# Patient Record
Sex: FEMALE | Race: WHITE | Employment: PART TIME | URBAN - METROPOLITAN AREA
[De-identification: names, ages, dates, MRNs, and addresses within clinical notes are randomized per-mention and may not be internally consistent; named-entity substitution may affect disease eponyms.]

---

## 2020-01-16 ENCOUNTER — HOSPITAL ENCOUNTER (EMERGENCY)
Age: 50
Discharge: HOME | End: 2020-01-16
Payer: COMMERCIAL

## 2020-01-16 ENCOUNTER — APPOINTMENT (OUTPATIENT)
Dept: GENERAL RADIOLOGY | Age: 50
End: 2020-01-16
Payer: COMMERCIAL

## 2020-01-16 VITALS
HEART RATE: 72 BPM | HEIGHT: 66 IN | TEMPERATURE: 98.3 F | DIASTOLIC BLOOD PRESSURE: 80 MMHG | SYSTOLIC BLOOD PRESSURE: 144 MMHG | RESPIRATION RATE: 16 BRPM | OXYGEN SATURATION: 100 % | WEIGHT: 280 LBS | BODY MASS INDEX: 45 KG/M2

## 2020-01-16 PROCEDURE — 90471 IMMUNIZATION ADMIN: CPT | Performed by: NURSE PRACTITIONER

## 2020-01-16 PROCEDURE — 90715 TDAP VACCINE 7 YRS/> IM: CPT | Performed by: NURSE PRACTITIONER

## 2020-01-16 PROCEDURE — 6360000002 HC RX W HCPCS: Performed by: NURSE PRACTITIONER

## 2020-01-16 PROCEDURE — 99283 EMERGENCY DEPT VISIT LOW MDM: CPT

## 2020-01-16 PROCEDURE — 6370000000 HC RX 637 (ALT 250 FOR IP): Performed by: NURSE PRACTITIONER

## 2020-01-16 PROCEDURE — 73660 X-RAY EXAM OF TOE(S): CPT

## 2020-01-16 RX ORDER — CEPHALEXIN 500 MG/1
500 CAPSULE ORAL 4 TIMES DAILY
Qty: 20 CAPSULE | Refills: 0 | Status: SHIPPED | OUTPATIENT
Start: 2020-01-16 | End: 2020-01-21

## 2020-01-16 RX ORDER — NAPROXEN 500 MG/1
500 TABLET ORAL 2 TIMES DAILY PRN
Qty: 14 TABLET | Refills: 0 | Status: SHIPPED | OUTPATIENT
Start: 2020-01-16 | End: 2022-10-26

## 2020-01-16 RX ORDER — DIAPER,BRIEF,INFANT-TODD,DISP
EACH MISCELLANEOUS ONCE
Status: COMPLETED | OUTPATIENT
Start: 2020-01-16 | End: 2020-01-16

## 2020-01-16 RX ORDER — IBUPROFEN 800 MG/1
800 TABLET ORAL ONCE
Status: COMPLETED | OUTPATIENT
Start: 2020-01-16 | End: 2020-01-16

## 2020-01-16 RX ADMIN — IBUPROFEN 800 MG: 800 TABLET ORAL at 18:57

## 2020-01-16 RX ADMIN — BACITRACIN ZINC: 500 OINTMENT TOPICAL at 19:22

## 2020-01-16 RX ADMIN — TETANUS TOXOID, REDUCED DIPHTHERIA TOXOID AND ACELLULAR PERTUSSIS VACCINE, ADSORBED 0.5 ML: 5; 2.5; 8; 8; 2.5 SUSPENSION INTRAMUSCULAR at 18:57

## 2020-01-16 ASSESSMENT — PAIN SCALES - GENERAL: PAINLEVEL_OUTOF10: 0

## 2020-01-16 NOTE — ED PROVIDER NOTES
right.              Tenderness: mild. Swelling: None. Deformity: no.              ROM: full range of motion. Skin:  Small puncture wound with bruising to the DIP of the great toe. Neurovascular: Motor deficit: none. Sensory deficit: none; full sensation intact to light touch in distal toe. Pulse deficit: none; 2 + pedal and posterior tibial pulses intact. Capillary refill: normal.  Foot:             Tenderness:  none. Swelling: None. Deformity: no.            ROM: full range of motion. Skin:  no erythema, rash or wounds noted. Gait:  normal.  Lymphatics: No lymphangitis or adenopathy noted. Neurological:  Oriented. Motor functions intact. .    Lab / Imaging Results   (All laboratory and radiology results have been personally reviewed by myself)  Labs:  No results found for this visit on 01/16/20. Imaging: All Radiology results interpreted by Radiologist unless otherwise noted. XR TOE RIGHT (MIN 2 VIEWS)   Final Result      No acute fracture is identified. ED Course / Medical Decision Making     Medications   ibuprofen (ADVIL;MOTRIN) tablet 800 mg (800 mg Oral Given 1/16/20 1857)   Tetanus-Diphth-Acell Pertussis (BOOSTRIX) injection 0.5 mL (0.5 mLs Intramuscular Given 1/16/20 1857)   bacitracin zinc ointment ( Topical Given 1/16/20 1922)        Re-examination:  1/16/20       Time: 1954 Discussed results. Consult(s):   None    Procedure(s):   none    MDM:    Films were obtained and are negative for fracture. Plan is subsequently for symptom control, limited weight-bearing and  immobilization with appropriate outpatient follow-up with PCP for re evaluation. Advised on signs and symptoms warranting immediate return. Tetanus was updated today and will be given ATB's prophylactically. Counseling:    The emergency provider has spoken with the patient and son and discussed todays results, in addition to providing specific details for the plan of care and counseling regarding the diagnosis and prognosis. Questions are answered at this time and they are agreeable with the plan. Assessment      1. Puncture wound of great toe of right foot, initial encounter    2. Need for Tdap vaccination      Plan   Discharge to home  Patient condition is good    New Medications     Discharge Medication List as of 1/16/2020  7:54 PM      START taking these medications    Details   naproxen (NAPROSYN) 500 MG tablet Take 1 tablet by mouth 2 times daily as needed for Pain (take with food and full glass of water.), Disp-14 tablet, R-0Print      cephALEXin (KEFLEX) 500 MG capsule Take 1 capsule by mouth 4 times daily for 5 days, Disp-20 capsule, R-0Print           Electronically signed by HIGINIO Champagne CNP   DD: 1/16/20  **This report was transcribed using voice recognition software. Every effort was made to ensure accuracy; however, inadvertent computerized transcription errors may be present.   END OF ED PROVIDER NOTE      HIGINIO Champagne CNP  01/17/20 [id number]

## 2022-10-18 ENCOUNTER — HOSPITAL ENCOUNTER (EMERGENCY)
Age: 52
Discharge: HOME | End: 2022-10-18
Attending: EMERGENCY MEDICINE
Payer: COMMERCIAL

## 2022-10-18 VITALS
RESPIRATION RATE: 18 BRPM | SYSTOLIC BLOOD PRESSURE: 133 MMHG | OXYGEN SATURATION: 99 % | DIASTOLIC BLOOD PRESSURE: 68 MMHG | HEART RATE: 82 BPM | TEMPERATURE: 97.9 F | WEIGHT: 225 LBS | BODY MASS INDEX: 36.87 KG/M2

## 2022-10-18 DIAGNOSIS — K42.9 UMBILICAL HERNIA WITHOUT OBSTRUCTION AND WITHOUT GANGRENE: Primary | ICD-10-CM

## 2022-10-18 PROCEDURE — 99282 EMERGENCY DEPT VISIT SF MDM: CPT

## 2022-10-18 ASSESSMENT — ENCOUNTER SYMPTOMS
BACK PAIN: 0
EYE REDNESS: 0
ABDOMINAL PAIN: 1
DIARRHEA: 0
SORE THROAT: 0
ABDOMINAL DISTENTION: 0
NAUSEA: 0
VOMITING: 0
SHORTNESS OF BREATH: 0
EYE PAIN: 0
EYE DISCHARGE: 0
WHEEZING: 0
COUGH: 0
SINUS PRESSURE: 0

## 2022-10-18 ASSESSMENT — PAIN - FUNCTIONAL ASSESSMENT: PAIN_FUNCTIONAL_ASSESSMENT: 0-10

## 2022-10-18 ASSESSMENT — PAIN DESCRIPTION - ORIENTATION: ORIENTATION: MID

## 2022-10-18 ASSESSMENT — PAIN DESCRIPTION - LOCATION: LOCATION: ABDOMEN

## 2022-10-18 ASSESSMENT — PAIN SCALES - GENERAL: PAINLEVEL_OUTOF10: 6

## 2022-10-18 ASSESSMENT — LIFESTYLE VARIABLES: HOW OFTEN DO YOU HAVE A DRINK CONTAINING ALCOHOL: NEVER

## 2022-10-18 ASSESSMENT — PAIN DESCRIPTION - PAIN TYPE: TYPE: ACUTE PAIN

## 2022-10-18 ASSESSMENT — PAIN DESCRIPTION - DESCRIPTORS: DESCRIPTORS: CRAMPING;DISCOMFORT

## 2022-10-18 NOTE — ED PROVIDER NOTES
The history is provided by the patient and medical records. 65-year-old female presents emergency department with complaint of periumbilical abdominal pain going on for the past several days. Feels like she might have a hernia. States it kind of comes in and out. However is not going back in at this time. Denies any fever chills denies any chest pain or shortness of breath no other acute complaints is never had a hernia before    The patient presents with abdominal pain that has been going on for 2 to 3 days. These symptoms are moderate in severity. Symptoms are made better by nothing. Symptoms are made worse by nothing. Associated symptoms include none. Review of Systems   Constitutional:  Negative for chills and fever. HENT:  Negative for ear pain, sinus pressure and sore throat. Eyes:  Negative for pain, discharge and redness. Respiratory:  Negative for cough, shortness of breath and wheezing. Cardiovascular:  Negative for chest pain. Gastrointestinal:  Positive for abdominal pain. Negative for abdominal distention, diarrhea, nausea and vomiting. Genitourinary:  Negative for dysuria and frequency. Musculoskeletal:  Negative for arthralgias and back pain. Skin:  Negative for rash and wound. Neurological:  Negative for weakness and headaches. Hematological:  Negative for adenopathy. All other systems reviewed and are negative. Physical Exam  Vitals and nursing note reviewed. Constitutional:       General: She is not in acute distress. Appearance: Normal appearance. She is normal weight. She is not toxic-appearing. HENT:      Head: Normocephalic and atraumatic. Eyes:      Conjunctiva/sclera: Conjunctivae normal.   Cardiovascular:      Rate and Rhythm: Normal rate and regular rhythm. Pulses: Normal pulses. Heart sounds: Normal heart sounds. No murmur heard. No gallop. Pulmonary:      Effort: Pulmonary effort is normal. No respiratory distress. Breath sounds: Normal breath sounds. No wheezing or rales. Abdominal:      General: Abdomen is flat. Bowel sounds are normal. There is no distension. Palpations: Abdomen is soft. Tenderness: There is abdominal tenderness in the periumbilical area. There is no guarding. Comments: Periumbilical abdominal pain patient noted to have umbilical hernia at that time it is easily reducible. Musculoskeletal:         General: No swelling. Skin:     General: Skin is warm and dry. Capillary Refill: Capillary refill takes less than 2 seconds. Coloration: Skin is not jaundiced. Findings: No bruising. Neurological:      General: No focal deficit present. Mental Status: She is alert and oriented to person, place, and time. Psychiatric:         Mood and Affect: Mood normal.         Thought Content: Thought content normal.        Procedures     MDM     53-year-old female presents emergency department with complaint of paramedical abdominal pain. Is noted to have a hernia on physical examination that was easily reducible at bedside patient's pain much improved following this. She is safe to be discharged home and follow-up with her family doctor as well as general surgeon was provided in her paperwork. She had no other complaints at this time. Patient safe for discharge from the emergency department. Did advise on return precautions to the emergency department. Did also advise follow-up with her primary care physician. Patient agreeable with the plan moving forward.                --------------------------------------------- PAST HISTORY ---------------------------------------------  Past Medical History:  has a past medical history of Asthma and PE (pulmonary embolism). Past Surgical History:  has a past surgical history that includes Leg Surgery and Tonsillectomy. Social History:  reports that she quit smoking about 9 years ago. She has a 11.00 pack-year smoking history. She has never used smokeless tobacco. She reports that she does not drink alcohol and does not use drugs. Family History: family history includes Cancer in her maternal grandmother, mother, and paternal grandmother; Diabetes in her mother and paternal uncle; Heart Disease in her maternal grandmother and mother. The patients home medications have been reviewed. Allergies: Erythromycin and Jantoven [warfarin]    -------------------------------------------------- RESULTS -------------------------------------------------  Labs:  No results found for this visit on 10/18/22. Radiology:  No orders to display       ------------------------- NURSING NOTES AND VITALS REVIEWED ---------------------------  Date / Time Roomed:  10/18/2022  5:14 PM  ED Bed Assignment:  [id number]    The nursing notes within the ED encounter and vital signs as below have been reviewed. /68   Pulse 82   Temp 97.9 °F (36.6 °C)   Resp 18   Wt 225 lb (102.1 kg)   SpO2 99%   BMI 36.87 kg/m²   Oxygen Saturation Interpretation: Normal      ------------------------------------------ PROGRESS NOTES ------------------------------------------  5:43 PM EDT  I have spoken with the patient and discussed todays results, in addition to providing specific details for the plan of care and counseling regarding the diagnosis and prognosis. Their questions are answered at this time and they are agreeable with the plan. I discussed at length with them reasons for immediate return here for re evaluation. They will followup with their primary care physician by calling their office on Monday.      --------------------------------- ADDITIONAL PROVIDER NOTES ---------------------------------  At this time the patient is without objective evidence of an acute process requiring hospitalization or inpatient management.   They have remained hemodynamically stable throughout their entire ED visit and are stable for discharge with outpatient follow-up. The plan has been discussed in detail and they are aware of the specific conditions for emergent return, as well as the importance of follow-up. New Prescriptions    No medications on file       Diagnosis:  1. Umbilical hernia without obstruction and without gangrene        Disposition:  Patient's disposition: Discharge to home  Patient's condition is stable.        Teresa Hyatt MD  Resident  10/18/22 [id number]

## 2022-10-24 ENCOUNTER — TELEPHONE (OUTPATIENT)
Dept: SURGERY | Age: 52
End: 2022-10-24

## 2022-10-24 ENCOUNTER — INITIAL CONSULT (OUTPATIENT)
Dept: SURGERY | Age: 52
End: 2022-10-24
Payer: COMMERCIAL

## 2022-10-24 VITALS
WEIGHT: 245 LBS | HEART RATE: 72 BPM | RESPIRATION RATE: 18 BRPM | DIASTOLIC BLOOD PRESSURE: 86 MMHG | SYSTOLIC BLOOD PRESSURE: 143 MMHG | BODY MASS INDEX: 39.37 KG/M2 | TEMPERATURE: 97 F | HEIGHT: 66 IN

## 2022-10-24 DIAGNOSIS — R10.31 RIGHT GROIN PAIN: ICD-10-CM

## 2022-10-24 DIAGNOSIS — K42.9 UMBILICAL HERNIA WITHOUT OBSTRUCTION AND WITHOUT GANGRENE: Primary | ICD-10-CM

## 2022-10-24 PROCEDURE — G8427 DOCREV CUR MEDS BY ELIG CLIN: HCPCS | Performed by: SURGERY

## 2022-10-24 PROCEDURE — 99204 OFFICE O/P NEW MOD 45 MIN: CPT | Performed by: SURGERY

## 2022-10-24 PROCEDURE — 4004F PT TOBACCO SCREEN RCVD TLK: CPT | Performed by: SURGERY

## 2022-10-24 PROCEDURE — G8417 CALC BMI ABV UP PARAM F/U: HCPCS | Performed by: SURGERY

## 2022-10-24 PROCEDURE — 3017F COLORECTAL CA SCREEN DOC REV: CPT | Performed by: SURGERY

## 2022-10-24 PROCEDURE — G8484 FLU IMMUNIZE NO ADMIN: HCPCS | Performed by: SURGERY

## 2022-10-24 RX ORDER — SODIUM CHLORIDE 0.9 % (FLUSH) 0.9 %
5-40 SYRINGE (ML) INJECTION PRN
Status: CANCELLED | OUTPATIENT
Start: 2022-10-24

## 2022-10-24 RX ORDER — SODIUM CHLORIDE 0.9 % (FLUSH) 0.9 %
5-40 SYRINGE (ML) INJECTION EVERY 12 HOURS SCHEDULED
Status: CANCELLED | OUTPATIENT
Start: 2022-10-24

## 2022-10-24 RX ORDER — 0.9 % SODIUM CHLORIDE 0.9 %
500 INTRAVENOUS SOLUTION INTRAVENOUS ONCE
Status: CANCELLED | OUTPATIENT
Start: 2022-10-24 | End: 2022-10-24

## 2022-10-24 RX ORDER — SODIUM CHLORIDE 9 MG/ML
INJECTION, SOLUTION INTRAVENOUS PRN
Status: CANCELLED | OUTPATIENT
Start: 2022-10-24

## 2022-10-24 RX ORDER — SODIUM CHLORIDE 9 MG/ML
INJECTION, SOLUTION INTRAVENOUS CONTINUOUS
Status: CANCELLED | OUTPATIENT
Start: 2022-10-24

## 2022-10-24 NOTE — TELEPHONE ENCOUNTER
Per Dr. Erlinda Taveras, patient is scheduled for Laparoscopic robotic ventral hernia repair with mesh possible right inguinal hernia repair with mesh possible bilateral at [address] [place] [address] on 10/31/22. Surgery scheduled via [facility], surgeon's calendar updated. Dr. Erlinda Taveras to enter orders. Follow up appointment scheduled. Electronically signed by Benito Portillo RN on 10/24/2022 at 8:50 AM    Prior Authorization Form:      DEMOGRAPHICS:                     Patient Name:  Roselinda Schlatter  Patient :  1970            Insurance:  Payor: [address] [place] [address] [address] / Plan: [address] [place] [address] [address] / Product Type: *No Product type* /   Insurance ID Number:    Payer/Plan Subscr  Sex Relation Sub.  Ins. ID Effective Group Num   1. JAIME OG* YUMIKO RANDOLPH* 1970 Female Self [id number]                                    [address]         DIAGNOSIS & PROCEDURE:                       Procedure/Operation: Laparoscopic robotic ventral hernia repair with mesh            CPT Code: 65960    Diagnosis:  umbilical hernia     GAS27 Code: k42.9     Location:  [address] [place] [address]    Surgeon:  Abhi North INFORMATION:                          Date: 10/31/22    Time: TBD              Anesthesia:  General                                                       Status:  Outpatient        Special Comments:         Electronically signed by Benito Portillo RN on 10/24/2022 at 8:50 AM

## 2022-10-24 NOTE — PROGRESS NOTES
General Surgery History and Physical  [facility]    Patient's Name/Date of Birth: Melani Kuo / 1970    Date: October 24, 2022     Surgeon: Anahy Barrett M.D.    PCP: Chuck Mendieta DO     Chief Complaint: Abdominal hernia    HPI:   Melani Kuo is a 50 y.o. female who presents for evaluation of ventral hernia. Timing is constant, radiation to midline, alleviated by none and started weeks ago while coughing and severity is 7/10. she has history of multiple abdominal surgeries. she has no history of previous repairs of the hernia. Denies nausea, vomiting, fever, chills, SOB, chest pain, diarrheal constipation. No history of abnormal colonoscopy. They are a smoker. Patient Active Problem List   Diagnosis    Pulmonary embolism and infarction ([address]    Tobacco abuse    Back pain       Past Medical History:   Diagnosis Date    Asthma     as small child, no problems as adult    PE (pulmonary embolism)     bilateral       Past Surgical History:   Procedure Laterality Date    LEG SURGERY      Femur fracture     TONSILLECTOMY         Allergies   Allergen Reactions    Erythromycin Shortness Of Breath     Pt states all \"mycins\" cause Shortness of Breath    Jantoven [Warfarin] Nausea And Vomiting     Can take Warfarin, Jantoven upsets her stomach       Current Outpatient Medications on File Prior to Visit   Medication Sig Dispense Refill    naproxen (NAPROSYN) 500 MG tablet Take 1 tablet by mouth 2 times daily as needed for Pain (take with food and full glass of water.) 14 tablet 0     No current facility-administered medications on file prior to visit. The patient has a family history that is negative for severe cardiovascular or respiratory issues, negative for reaction to anesthesia. No history of gastrointestinal cancer was reported in their mother or father. Time spent reviewing past medical, surgical, social and family history, vitals, nursing assessment and images.  No changes from above documented history. Social History     Socioeconomic History    Marital status:      Spouse name: Not on file    Number of children: Not on file    Years of education: Not on file    Highest education level: Not on file   Occupational History    Not on file   Tobacco Use    Smoking status: Every Day     Packs/day: 0.50     Years: 22.00     Pack years: 11.00     Types: Cigarettes     Last attempt to quit: 4/10/2013     Years since quittin.5    Smokeless tobacco: Never   Substance and Sexual Activity    Alcohol use: No    Drug use: No    Sexual activity: Yes     Partners: Male   Other Topics Concern    Not on file   Social History Narrative    Not on file     Social Determinants of Health     Financial Resource Strain: Not on file   Food Insecurity: Not on file   Transportation Needs: Not on file   Physical Activity: Not on file   Stress: Not on file   Social Connections: Not on file   Intimate Partner Violence: Not on file   Housing Stability: Not on file       A complete 10 system review was performed and are otherwise negative unless mentioned in the above HPI. Specific negatives are listed below but may not include all those reviewed.     General ROS: negative obtundation, AMS  ENT ROS: negative rhinorrhea, epistaxis  Allergy and Immunology ROS: negative itchy/watery eyes or nasal congestion  Hematological and Lymphatic ROS: negative spontaneous bleeding or bruising  Endocrine ROS: negative  lethargy, mood swings, palpitations or polydipsia/polyuria  Respiratory ROS: negative sputum changes, stridor, tachypnea or wheezing  Cardiovascular ROS: negative for - loss of consciousness, murmur or orthopnea  Gastrointestinal ROS: negative for - hematochezia or hematemesis  Genito-Urinary ROS: negative for -  genital discharge or hematuria  Musculoskeletal ROS: negative for - focal weakness, gangrene  Psych/Neuro ROS: negative for - visual or auditory hallucinations, suicidal ideation    Physical exam:   BP (!) 143/86   Pulse 72   Temp 97 °F (36.1 °C) (Temporal)   Resp 18   Ht 5' 5.5\" (1.664 m)   Wt 245 lb (111.1 kg)   LMP 05/18/2015   BMI 40.15 kg/m²   General appearance:  NAD, appears stated age  Head: NCAT, PERRLA, EOMI, red conjunctiva  Neck: supple, no masses, trachea midline  Lungs: Equal chest rise bilateral, no retractions, no wheezing  Heart: Reg rate  Abdomen: soft, obese, partially reducibel umbilical hernia, no right groin hernia palpated  Skin; warm and dry, no cyanosis  Gu: no cva tenderness  Extremities: atraumatic, no focal motor deficits, no open wounds  Psych: No tremor, visual hallucinations        Radiology: I reviewed relevant abdominal imaging from this admission and that available in the EMR     Assessment:  Markie Landa is a [address] y.o. female with Incarcerated ventral hernia, right groin pain  Patient Active Problem List   Diagnosis    Pulmonary embolism and infarction (HCC)    Tobacco abuse    Back pain         Plan:  There is no contributing family history to this above complaint in the patient's mother or father  We discussed at length the patient's previous abdominal imaging, reviewed the anatomical findings of the physical exam and imaging  I discussed with him what the diagnosis of the hernia entails. I explained abdominal wall anatomy. We discussed mechanism of action of pain medication including Tylenol and ibuprofen for pain control  We discussed the operative and nonoperative management of hernias. We discussed the incarceration rate strangulation rate and risks of emergent surgery. I reviewed at length emergent care and medical professional notes enclosed in the electronic medical record, independently reviewed their assessment and uses to formulate the opinion to recommend surgical intervention.     I recommend OR for Laparoscopic robot assisted ventral hernia repair with mesh poss right inguinal hernia repair poss mesh    Discussed the risk, benefits and alternatives of surgery including wound infections, bleeding, scar, seroma, mesh infection, mesh removal and migration and recurrent hernia formation and the risks of general anesthetic including MI, CVA, sudden death or reactions to anesthetic medications. The patient understands the risks and alternatives and the possibility of converting to an open procedure. All questions were answered to the patient's satisfaction and they freely signed the consent.          Ulla Mortimer, MD  8:28 AM  10/24/2022

## 2022-10-26 VITALS — BODY MASS INDEX: 40.82 KG/M2 | HEIGHT: 65 IN | WEIGHT: 245 LBS

## 2022-10-26 RX ORDER — IBUPROFEN 200 MG
200 TABLET ORAL EVERY 6 HOURS PRN
Status: ON HOLD | COMMUNITY
End: 2022-10-31 | Stop reason: HOSPADM

## 2022-10-26 NOTE — PROGRESS NOTES
[address] [facility]                                                                                                                    PRE OP INSTRUCTIONS FOR  Pradip Morocho        Date: 10/26/2022    Date of surgery: 10/31/22 Arrival Time: Hospital will call you between 5pm and 7pm with your final arrival time for surgery    Do not eat or drink anything after  prior to surgery. This includes no water, chewing gum, mints or ice chips. Take the following medications with a small sip of water on the morning of Surgery: none     Diabetics may take evening dose of insulin but none after midnight. If you feel symptomatic or low blood sugar morning of surgery drink 1-2 ounces of apple juice only. Aspirin, Ibuprofen, Advil, Naproxen, Vitamin E and other Anti-inflammatory products should be stopped  before surgery  as directed by your physician. Take Tylenol only unless instructed otherwise by your surgeon. Check with your Doctor regarding stopping Plavix, Coumadin, Lovenox, Eliquis, Effient, or other blood thinners. Do not smoke,use illicit drugs and do not drink any alcoholic beverages 24 hours prior to surgery. You may brush your teeth the morning of surgery. DO NOT SWALLOW WATER    You MUST make arrangements for a responsible adult to take you home after your surgery. You will not be allowed to leave alone or drive yourself home. It is strongly suggested someone stay with you the first 24 hrs. Your surgery will be cancelled if you do not have a ride home. PEDIATRIC PATIENTS ONLY:  A parent/legal guardian must accompany a child scheduled for surgery and plan to stay at the hospital until the child is discharged. Please do not bring other children with you. Please wear simple, loose fitting clothing to the hospital.  Fredis Bety not bring valuables (money, credit cards, checkbooks, etc.) Do not wear any makeup (including no eye makeup) or nail polish on your fingers or toes.     DO NOT wear any jewelry or piercings on day of surgery. All body piercing jewelry must be removed. Shower the night before surgery with ___Antibacterial soap /BARRINGTON WIPES________    TOTAL JOINT REPLACEMENT/HYSTERECTOMY PATIENTS ONLY---Remember to bring Blood Bank bracelet to the hospital on the day of surgery. If you have a Living Will and Durable Power of  for Healthcare, please bring in a copy. If appropriate bring crutches, inspirex, WALKER, CANE etc... Notify your Surgeon if you develop any illness between now and surgery time, cough, cold, fever, sore throat, nausea, vomiting, etc.  Please notify your surgeon if you experience dizziness, shortness of breath or blurred vision between now & the time of your surgery. If you have ___dentures, they will be removed before going to the OR; we will provide you a container. If you wear ___contact lenses or ___glasses, they will be removed; please bring a case for them. To provide excellent care visitors will be limited to 1 in the room at any given time. Please bring picture ID and insurance card. Sleep apnea patients need to bring CPAP AND SETTINGS to hospital on day of surgery. During flu season no children under the age of 15 are permitted in the hospital for the safety of all patients. Other                  Please call AMBULATORY CARE if you have any further questions.    [address] [facility]     [address]

## 2022-10-27 ENCOUNTER — HOSPITAL ENCOUNTER (OUTPATIENT)
Dept: PREADMISSION TESTING | Age: 52
Discharge: HOME | End: 2022-10-27

## 2022-10-28 ENCOUNTER — ANESTHESIA EVENT (OUTPATIENT)
Dept: OPERATING ROOM | Age: 52
End: 2022-10-28
Payer: COMMERCIAL

## 2022-10-31 ENCOUNTER — HOSPITAL ENCOUNTER (OUTPATIENT)
Age: 52
Setting detail: OUTPATIENT SURGERY
Discharge: HOME | End: 2022-10-31
Attending: SURGERY | Admitting: SURGERY
Payer: COMMERCIAL

## 2022-10-31 ENCOUNTER — ANESTHESIA (OUTPATIENT)
Dept: OPERATING ROOM | Age: 52
End: 2022-10-31
Payer: COMMERCIAL

## 2022-10-31 VITALS
DIASTOLIC BLOOD PRESSURE: 77 MMHG | RESPIRATION RATE: 18 BRPM | WEIGHT: 244 LBS | HEIGHT: 65 IN | TEMPERATURE: 97 F | HEART RATE: 73 BPM | OXYGEN SATURATION: 97 % | SYSTOLIC BLOOD PRESSURE: 135 MMHG | BODY MASS INDEX: 40.65 KG/M2

## 2022-10-31 DIAGNOSIS — G89.18 POSTOPERATIVE PAIN: ICD-10-CM

## 2022-10-31 DIAGNOSIS — K42.9 UMBILICAL HERNIA WITHOUT OBSTRUCTION AND WITHOUT GANGRENE: Primary | ICD-10-CM

## 2022-10-31 PROCEDURE — 7100000010 HC PHASE II RECOVERY - FIRST 15 MIN: Performed by: SURGERY

## 2022-10-31 PROCEDURE — 49653 PR LAP, VENTRAL HERNIA REPAIR,INCARCERATED: CPT | Performed by: SURGERY

## 2022-10-31 PROCEDURE — 2709999900 HC NON-CHARGEABLE SUPPLY: Performed by: SURGERY

## 2022-10-31 PROCEDURE — S2900 ROBOTIC SURGICAL SYSTEM: HCPCS | Performed by: SURGERY

## 2022-10-31 PROCEDURE — 3600000009 HC SURGERY ROBOT BASE: Performed by: SURGERY

## 2022-10-31 PROCEDURE — 2500000003 HC RX 250 WO HCPCS

## 2022-10-31 PROCEDURE — 7100000011 HC PHASE II RECOVERY - ADDTL 15 MIN: Performed by: SURGERY

## 2022-10-31 PROCEDURE — 7100000000 HC PACU RECOVERY - FIRST 15 MIN: Performed by: SURGERY

## 2022-10-31 PROCEDURE — 3700000000 HC ANESTHESIA ATTENDED CARE: Performed by: SURGERY

## 2022-10-31 PROCEDURE — 2500000003 HC RX 250 WO HCPCS: Performed by: SURGERY

## 2022-10-31 PROCEDURE — 6360000002 HC RX W HCPCS

## 2022-10-31 PROCEDURE — 2580000003 HC RX 258: Performed by: SURGERY

## 2022-10-31 PROCEDURE — 3700000001 HC ADD 15 MINUTES (ANESTHESIA): Performed by: SURGERY

## 2022-10-31 PROCEDURE — C1781 MESH (IMPLANTABLE): HCPCS | Performed by: SURGERY

## 2022-10-31 PROCEDURE — 6370000000 HC RX 637 (ALT 250 FOR IP): Performed by: ANESTHESIOLOGY

## 2022-10-31 PROCEDURE — 6360000002 HC RX W HCPCS: Performed by: SURGERY

## 2022-10-31 PROCEDURE — 7100000001 HC PACU RECOVERY - ADDTL 15 MIN: Performed by: SURGERY

## 2022-10-31 PROCEDURE — 3600000019 HC SURGERY ROBOT ADDTL 15MIN: Performed by: SURGERY

## 2022-10-31 DEVICE — BARD SOFT MESH, 3" X 6" (7.5 CM X 15 CM)
Type: IMPLANTABLE DEVICE | Site: ABDOMEN | Status: FUNCTIONAL
Brand: BARD

## 2022-10-31 RX ORDER — OXYCODONE HYDROCHLORIDE AND ACETAMINOPHEN 5; 325 MG/1; MG/1
1 TABLET ORAL ONCE
Status: COMPLETED | OUTPATIENT
Start: 2022-10-31 | End: 2022-10-31

## 2022-10-31 RX ORDER — BUPIVACAINE HYDROCHLORIDE 2.5 MG/ML
INJECTION, SOLUTION EPIDURAL; INFILTRATION; INTRACAUDAL PRN
Status: DISCONTINUED | OUTPATIENT
Start: 2022-10-31 | End: 2022-10-31 | Stop reason: ALTCHOICE

## 2022-10-31 RX ORDER — ONDANSETRON 2 MG/ML
INJECTION INTRAMUSCULAR; INTRAVENOUS PRN
Status: DISCONTINUED | OUTPATIENT
Start: 2022-10-31 | End: 2022-10-31 | Stop reason: SDUPTHER

## 2022-10-31 RX ORDER — METHOCARBAMOL 100 MG/ML
INJECTION, SOLUTION INTRAMUSCULAR; INTRAVENOUS
Status: COMPLETED
Start: 2022-10-31 | End: 2022-10-31

## 2022-10-31 RX ORDER — KETOROLAC TROMETHAMINE 30 MG/ML
INJECTION, SOLUTION INTRAMUSCULAR; INTRAVENOUS
Status: COMPLETED
Start: 2022-10-31 | End: 2022-10-31

## 2022-10-31 RX ORDER — METHOCARBAMOL 100 MG/ML
1000 INJECTION, SOLUTION INTRAMUSCULAR; INTRAVENOUS ONCE
Status: COMPLETED | OUTPATIENT
Start: 2022-10-31 | End: 2022-10-31

## 2022-10-31 RX ORDER — HYDRALAZINE HYDROCHLORIDE 20 MG/ML
10 INJECTION INTRAMUSCULAR; INTRAVENOUS
Status: DISCONTINUED | OUTPATIENT
Start: 2022-10-31 | End: 2022-10-31 | Stop reason: HOSPADM

## 2022-10-31 RX ORDER — DIPHENHYDRAMINE HYDROCHLORIDE 50 MG/ML
12.5 INJECTION INTRAMUSCULAR; INTRAVENOUS
Status: DISCONTINUED | OUTPATIENT
Start: 2022-10-31 | End: 2022-10-31 | Stop reason: HOSPADM

## 2022-10-31 RX ORDER — MIDAZOLAM HYDROCHLORIDE 1 MG/ML
INJECTION INTRAMUSCULAR; INTRAVENOUS PRN
Status: DISCONTINUED | OUTPATIENT
Start: 2022-10-31 | End: 2022-10-31 | Stop reason: SDUPTHER

## 2022-10-31 RX ORDER — ONDANSETRON 2 MG/ML
INJECTION INTRAMUSCULAR; INTRAVENOUS
Status: COMPLETED
Start: 2022-10-31 | End: 2022-10-31

## 2022-10-31 RX ORDER — MIDAZOLAM HYDROCHLORIDE 1 MG/ML
2 INJECTION INTRAMUSCULAR; INTRAVENOUS
Status: DISCONTINUED | OUTPATIENT
Start: 2022-10-31 | End: 2022-10-31 | Stop reason: HOSPADM

## 2022-10-31 RX ORDER — ONDANSETRON 4 MG/1
4 TABLET, ORALLY DISINTEGRATING ORAL EVERY 8 HOURS PRN
Qty: 20 TABLET | Refills: 1 | Status: SHIPPED | OUTPATIENT
Start: 2022-10-31

## 2022-10-31 RX ORDER — ROCURONIUM BROMIDE 10 MG/ML
INJECTION, SOLUTION INTRAVENOUS PRN
Status: DISCONTINUED | OUTPATIENT
Start: 2022-10-31 | End: 2022-10-31 | Stop reason: SDUPTHER

## 2022-10-31 RX ORDER — SODIUM CHLORIDE 0.9 % (FLUSH) 0.9 %
5-40 SYRINGE (ML) INJECTION EVERY 12 HOURS SCHEDULED
Status: DISCONTINUED | OUTPATIENT
Start: 2022-10-31 | End: 2022-10-31 | Stop reason: HOSPADM

## 2022-10-31 RX ORDER — KETOROLAC TROMETHAMINE 30 MG/ML
30 INJECTION, SOLUTION INTRAMUSCULAR; INTRAVENOUS
Status: DISCONTINUED | OUTPATIENT
Start: 2022-10-31 | End: 2022-10-31 | Stop reason: HOSPADM

## 2022-10-31 RX ORDER — METHOCARBAMOL 500 MG/1
500 TABLET, FILM COATED ORAL 4 TIMES DAILY PRN
Qty: 20 TABLET | Refills: 0 | Status: SHIPPED | OUTPATIENT
Start: 2022-10-31 | End: 2022-11-14 | Stop reason: SDUPTHER

## 2022-10-31 RX ORDER — IBUPROFEN 800 MG/1
800 TABLET ORAL EVERY 8 HOURS PRN
Qty: 30 TABLET | Refills: 0 | Status: SHIPPED | OUTPATIENT
Start: 2022-10-31 | End: 2022-11-14 | Stop reason: SDUPTHER

## 2022-10-31 RX ORDER — OXYCODONE HYDROCHLORIDE AND ACETAMINOPHEN 5; 325 MG/1; MG/1
1 TABLET ORAL EVERY 6 HOURS PRN
Qty: 10 TABLET | Refills: 0 | Status: SHIPPED | OUTPATIENT
Start: 2022-10-31 | End: 2022-11-03

## 2022-10-31 RX ORDER — MEPERIDINE HYDROCHLORIDE 25 MG/ML
12.5 INJECTION INTRAMUSCULAR; INTRAVENOUS; SUBCUTANEOUS EVERY 5 MIN PRN
Status: DISCONTINUED | OUTPATIENT
Start: 2022-10-31 | End: 2022-10-31 | Stop reason: HOSPADM

## 2022-10-31 RX ORDER — MORPHINE SULFATE 2 MG/ML
2 INJECTION, SOLUTION INTRAMUSCULAR; INTRAVENOUS EVERY 5 MIN PRN
Status: DISCONTINUED | OUTPATIENT
Start: 2022-10-31 | End: 2022-10-31 | Stop reason: HOSPADM

## 2022-10-31 RX ORDER — SODIUM CHLORIDE 0.9 % (FLUSH) 0.9 %
5-40 SYRINGE (ML) INJECTION PRN
Status: DISCONTINUED | OUTPATIENT
Start: 2022-10-31 | End: 2022-10-31 | Stop reason: HOSPADM

## 2022-10-31 RX ORDER — SODIUM CHLORIDE 9 MG/ML
INJECTION, SOLUTION INTRAVENOUS CONTINUOUS
Status: DISCONTINUED | OUTPATIENT
Start: 2022-10-31 | End: 2022-10-31 | Stop reason: HOSPADM

## 2022-10-31 RX ORDER — IPRATROPIUM BROMIDE AND ALBUTEROL SULFATE 2.5; .5 MG/3ML; MG/3ML
1 SOLUTION RESPIRATORY (INHALATION)
Status: DISCONTINUED | OUTPATIENT
Start: 2022-10-31 | End: 2022-10-31 | Stop reason: HOSPADM

## 2022-10-31 RX ORDER — SODIUM CHLORIDE 9 MG/ML
INJECTION, SOLUTION INTRAVENOUS PRN
Status: DISCONTINUED | OUTPATIENT
Start: 2022-10-31 | End: 2022-10-31 | Stop reason: HOSPADM

## 2022-10-31 RX ORDER — KETOROLAC TROMETHAMINE 30 MG/ML
30 INJECTION, SOLUTION INTRAMUSCULAR; INTRAVENOUS ONCE
Status: COMPLETED | OUTPATIENT
Start: 2022-10-31 | End: 2022-10-31

## 2022-10-31 RX ORDER — PROPOFOL 10 MG/ML
INJECTION, EMULSION INTRAVENOUS PRN
Status: DISCONTINUED | OUTPATIENT
Start: 2022-10-31 | End: 2022-10-31 | Stop reason: SDUPTHER

## 2022-10-31 RX ORDER — LABETALOL HYDROCHLORIDE 5 MG/ML
10 INJECTION, SOLUTION INTRAVENOUS
Status: DISCONTINUED | OUTPATIENT
Start: 2022-10-31 | End: 2022-10-31 | Stop reason: HOSPADM

## 2022-10-31 RX ORDER — FENTANYL CITRATE 50 UG/ML
INJECTION, SOLUTION INTRAMUSCULAR; INTRAVENOUS PRN
Status: DISCONTINUED | OUTPATIENT
Start: 2022-10-31 | End: 2022-10-31 | Stop reason: SDUPTHER

## 2022-10-31 RX ORDER — PROCHLORPERAZINE EDISYLATE 5 MG/ML
5 INJECTION INTRAMUSCULAR; INTRAVENOUS
Status: DISCONTINUED | OUTPATIENT
Start: 2022-10-31 | End: 2022-10-31 | Stop reason: HOSPADM

## 2022-10-31 RX ORDER — ONDANSETRON 2 MG/ML
4 INJECTION INTRAMUSCULAR; INTRAVENOUS
Status: COMPLETED | OUTPATIENT
Start: 2022-10-31 | End: 2022-10-31

## 2022-10-31 RX ORDER — DOCUSATE SODIUM 100 MG/1
100 CAPSULE, LIQUID FILLED ORAL 2 TIMES DAILY
Qty: 30 CAPSULE | Refills: 0 | Status: SHIPPED | OUTPATIENT
Start: 2022-10-31 | End: 2022-11-15

## 2022-10-31 RX ORDER — 0.9 % SODIUM CHLORIDE 0.9 %
500 INTRAVENOUS SOLUTION INTRAVENOUS ONCE
Status: DISCONTINUED | OUTPATIENT
Start: 2022-10-31 | End: 2022-10-31 | Stop reason: HOSPADM

## 2022-10-31 RX ORDER — LIDOCAINE HYDROCHLORIDE 20 MG/ML
INJECTION, SOLUTION INTRAVENOUS PRN
Status: DISCONTINUED | OUTPATIENT
Start: 2022-10-31 | End: 2022-10-31 | Stop reason: SDUPTHER

## 2022-10-31 RX ADMIN — CEFAZOLIN 2000 MG: 2 INJECTION, POWDER, FOR SOLUTION INTRAMUSCULAR; INTRAVENOUS at 11:58

## 2022-10-31 RX ADMIN — SUGAMMADEX 250 MG: 100 INJECTION, SOLUTION INTRAVENOUS at 12:50

## 2022-10-31 RX ADMIN — LIDOCAINE HYDROCHLORIDE 100 MG: 20 INJECTION, SOLUTION INTRAVENOUS at 11:47

## 2022-10-31 RX ADMIN — HYDROMORPHONE HYDROCHLORIDE 0.5 MG: 1 INJECTION, SOLUTION INTRAMUSCULAR; INTRAVENOUS; SUBCUTANEOUS at 13:25

## 2022-10-31 RX ADMIN — METHOCARBAMOL 1000 MG: 100 INJECTION, SOLUTION INTRAMUSCULAR; INTRAVENOUS at 13:14

## 2022-10-31 RX ADMIN — ROCURONIUM BROMIDE 10 MG: 10 INJECTION, SOLUTION INTRAVENOUS at 12:43

## 2022-10-31 RX ADMIN — ROCURONIUM BROMIDE 50 MG: 10 INJECTION, SOLUTION INTRAVENOUS at 11:47

## 2022-10-31 RX ADMIN — MIDAZOLAM 2 MG: 1 INJECTION INTRAMUSCULAR; INTRAVENOUS at 11:43

## 2022-10-31 RX ADMIN — FENTANYL CITRATE 50 MCG: 50 INJECTION, SOLUTION INTRAMUSCULAR; INTRAVENOUS at 13:04

## 2022-10-31 RX ADMIN — Medication 0.5 MG: at 13:46

## 2022-10-31 RX ADMIN — PHENYLEPHRINE HYDROCHLORIDE 100 MCG: 10 INJECTION INTRAVENOUS at 12:18

## 2022-10-31 RX ADMIN — KETOROLAC TROMETHAMINE 30 MG: 30 INJECTION, SOLUTION INTRAMUSCULAR; INTRAVENOUS at 13:12

## 2022-10-31 RX ADMIN — PHENYLEPHRINE HYDROCHLORIDE 100 MCG: 10 INJECTION INTRAVENOUS at 12:09

## 2022-10-31 RX ADMIN — ONDANSETRON 4 MG: 2 INJECTION INTRAMUSCULAR; INTRAVENOUS at 13:42

## 2022-10-31 RX ADMIN — OXYCODONE AND ACETAMINOPHEN 1 TABLET: 5; 325 TABLET ORAL at 15:12

## 2022-10-31 RX ADMIN — ONDANSETRON 4 MG: 2 INJECTION INTRAMUSCULAR; INTRAVENOUS at 12:54

## 2022-10-31 RX ADMIN — SODIUM CHLORIDE: 900 INJECTION, SOLUTION INTRAVENOUS at 10:40

## 2022-10-31 RX ADMIN — SODIUM CHLORIDE: 900 INJECTION, SOLUTION INTRAVENOUS at 12:10

## 2022-10-31 RX ADMIN — FENTANYL CITRATE 150 MCG: 50 INJECTION, SOLUTION INTRAMUSCULAR; INTRAVENOUS at 11:46

## 2022-10-31 RX ADMIN — PROPOFOL 200 MG: 10 INJECTION, EMULSION INTRAVENOUS at 11:47

## 2022-10-31 RX ADMIN — Medication 0.5 MG: at 13:25

## 2022-10-31 RX ADMIN — KETOROLAC TROMETHAMINE 30 MG: 30 INJECTION, SOLUTION INTRAMUSCULAR at 13:12

## 2022-10-31 RX ADMIN — HYDROMORPHONE HYDROCHLORIDE 0.5 MG: 1 INJECTION, SOLUTION INTRAMUSCULAR; INTRAVENOUS; SUBCUTANEOUS at 13:46

## 2022-10-31 RX ADMIN — FENTANYL CITRATE 50 MCG: 50 INJECTION, SOLUTION INTRAMUSCULAR; INTRAVENOUS at 12:49

## 2022-10-31 RX ADMIN — PHENYLEPHRINE HYDROCHLORIDE 100 MCG: 10 INJECTION INTRAVENOUS at 12:02

## 2022-10-31 ASSESSMENT — PAIN DESCRIPTION - DESCRIPTORS
DESCRIPTORS: SHARP
DESCRIPTORS: SHARP
DESCRIPTORS: ACHING;SHARP
DESCRIPTORS: ACHING;DISCOMFORT;SHARP

## 2022-10-31 ASSESSMENT — PAIN DESCRIPTION - LOCATION
LOCATION: ABDOMEN

## 2022-10-31 ASSESSMENT — PAIN SCALES - GENERAL
PAINLEVEL_OUTOF10: 8
PAINLEVEL_OUTOF10: 5
PAINLEVEL_OUTOF10: 7

## 2022-10-31 ASSESSMENT — PAIN - FUNCTIONAL ASSESSMENT: PAIN_FUNCTIONAL_ASSESSMENT: 0-10

## 2022-10-31 ASSESSMENT — PAIN DESCRIPTION - ORIENTATION: ORIENTATION: LOWER;MID

## 2022-10-31 NOTE — DISCHARGE INSTRUCTIONS
Patient Discharge Instructions [address], [place], [address] [facility] [place] (D)  [phone] (f)  Discharge Date:  10/31/2022    Discharged To: Home    RESUME ACTIVITY:     WOUND CARE: The day of surgery be sure to place ice to site of operation for 15min intervals. No need to sleep with ice in place. Keep protective cloth barrier between ice bag and skin to prevent frostbite. Bruising is normal after surgery. Ice will help reduce swelling and bruising. A bulge at the hernia site is normal after surgery and may persist for a few months. This is the normal healing process. BATHING:  May shower 24hrs after surgery, remove dressings after 24hrs if in place, leave steristrips in place as they will fall of independently. You may have adhesive glue covering your incisions which will dissolve on it own. May bathe or swim 5 days after surgery    DRIVING: No driving while on pain medications    RETURN TO WORK: after follow up appointment    WALKING:  Yes    SEXUAL ACTIVITY: Yes    STAIRS:  Yes    LIFTING: Less than 15 pounds for 4 weeks    DIET: General adult    SPECIAL INSTRUCTIONS:     Call physician if they or any other problems occur:  Fever over 101°    Redness, swelling, hardness or warmth at the operative site  Unrelieved nausea    Foul smelling or cloudy drainage at the operative site   Unrelieved pain    Blood soaked dressing. (Some oozing may be normal)    Call office for follow up appointment with Dr Samantha Andrade in 2 weeks. Call the office at [phone] if you have a fever > 100 F, or if your incision becomes red, tender, or drains more than a small amount of clear fluid. BOWELS: constipation is a side effect of your pain meds, take a daily laxative (miralax, dulcolax, etc.) as needed to keep your bowels moving as they normally do, do not go 2-3 days without having a bowel movement. Pain medications;    Percocet- take at least 1/2 pill every 6 hours the first 36 hours after surgery, and may take as many as 2 pills every 4 hours. After the first 36hours only take the pills as needed and stop them as soon as possible. Pain meds cause constipation so pay close attention to the \"bowels\" topic above. Keep incisions clean and dry. Vicodin/Percocet and ibuprofen for pain as prescribed. Okay to resume anticoagulant medication after 24hrs. Do not exceed 4000mg of Tylenol/Acetaminophen per day. Vicodin/Norco/Percocet contain acetaminophen. Do not take additional amounts of Tylenol if you are taking these medications. You are likely to have pain for the next few days. You may also feel like you have the flu, and you may have a low fever and feel tired and nauseated. This is common. You should feel better after a few days and will probably feel much better in 7 days. For several weeks you may feel twinges or pulling in the hernia repair when you move. This care sheet gives you a general idea about how long it will take for you to recover. But each person recovers at a different pace. Follow the steps below to get better as quickly as possible. Activity  Rest when you feel tired. Getting enough sleep will help you recover. Try to walk each day. Start by walking a little more than you did the day before. Bit by bit, increase the amount you walk. Walking boosts blood flow and helps prevent pneumonia and constipation. Avoid strenuous activities, such as biking, jogging, weight lifting, or aerobic exercise, until your doctor says it is okay. Avoid lifting anything that would make you strain. This may include heavy grocery bags and milk containers, a heavy briefcase or backpack, cat litter or dog food bags, a vacuum , or a child. You may drive when you are no longer taking pain medicine and can quickly move your foot from the gas pedal to the brake.  You must also be able to sit comfortably for a long period of time, even if you do not plan to go far. You might get caught in traffic. Most people are able to return to work within 1 to 2 weeks after surgery. You may shower 24 to 48 hours after surgery, if your doctor okays it. Pat the cut (incision) dry. Do not take a bath for the first 2 weeks, or until your doctor tells you it is okay. Your doctor will tell you when you can have sex again. Diet  You can eat your normal diet. If your stomach is upset, try bland, low-fat foods like plain rice, broiled chicken, toast, and yogurt. Drink plenty of fluids (unless your doctor tells you not to). You may notice that your bowel movements are not regular right after your surgery. This is common. Avoid constipation and straining with bowel movements. You may want to take a fiber supplement every day. If you have not had a bowel movement after a couple of days, ask your doctor about taking a mild laxative. Medicines  Be safe with medicines. Take pain medicines exactly as directed. If the doctor gave you a prescription medicine for pain, take it as prescribed. If you are not taking a prescription pain medicine, take an over-the-counter medicine such as acetaminophen (Tylenol), ibuprofen (Advil, Motrin), or naproxen (Aleve). Read and follow all instructions on the label. Do not take two or more pain medicines at the same time unless the doctor told you to. Many pain medicines have acetaminophen, which is Tylenol. Too much acetaminophen (Tylenol) can be harmful. If your doctor prescribed antibiotics, take them as directed. Do not stop taking them just because you feel better. You need to take the full course of antibiotics. If you think your pain medicine is making you sick to your stomach: Take your medicine after meals (unless your doctor has told you not to). Ask your doctor for a different pain medicine. Incision care  If you have strips of tape on the cut (incision) the doctor made, leave the tape on for a week or until it falls off.   If you have staples closing the cut, you will need to visit your doctor in 1 to 2 weeks to have them removed. Wash the area daily with warm, soapy water and pat it dry. Follow-up care is a key part of your treatment and safety. Be sure to make and go to all appointments, and call your doctor if you are having problems. It's also a good idea to know your test results and keep a list of the medicines you take. When should you call for help? Call 911 anytime you think you may need emergency care. For example, call if:  You passed out (lost consciousness). You have sudden chest pain and shortness of breath, or you cough up blood. You have severe pain in your belly. Call your doctor now or seek immediate medical care if:  You are sick to your stomach and cannot keep fluids down. You have signs of a blood clot, such as:  Pain in your calf, back of the knee, thigh, or groin. Redness and swelling in your leg or groin. You have trouble passing urine or stool, especially if you have mild pain or swelling in your lower belly. Bright red blood has soaked through the bandage over your incision. Watch closely for any changes in your health, and be sure to contact your doctor if:  Your swelling is getting worse. Your swelling is not going down.

## 2022-10-31 NOTE — ANESTHESIA PRE PROCEDURE
Department of Anesthesiology  Preprocedure Note       Name:  Redd Plascencia   Age:  50 y.o.  :  1970                                          MRN:  [id number]         Date:  10/31/2022      Surgeon: Heavenly Thayer):  Hunter Archuleta MD    Procedure: Procedure(s):  LAPAROSCOPIC ROBOTIC XI HERNIA VENTRAL REPAIR WITH MESH POSSIBLE RIGHT INGUINAL HERNIA REPAIR WITH MESH POSSIBLE BILATERAL    Medications prior to admission:   Prior to Admission medications    Medication Sig Start Date End Date Taking? Authorizing Provider   ibuprofen (ADVIL;MOTRIN) 200 MG tablet Take 200 mg by mouth every 6 hours as needed for Pain    Historical Provider, MD       Current medications:    Current Facility-Administered Medications   Medication Dose Route Frequency Provider Last Rate Last Admin    0.9 % sodium chloride infusion   IntraVENous Continuous Hunter Archuleta  mL/hr at 10/31/22 1040 New Bag at 10/31/22 1040    0.9 % sodium chloride bolus  500 mL IntraVENous Once Hunter Archuleta MD        sodium chloride flush 0.9 % injection 5-40 mL  5-40 mL IntraVENous 2 times per day Hunter Archuleta MD        sodium chloride flush 0.9 % injection 5-40 mL  5-40 mL IntraVENous PRN Hunter Archuleta MD        0.9 % sodium chloride infusion   IntraVENous PRN Hunter Archuleta MD        ceFAZolin (ANCEF) 2,000 mg in sterile water 20 mL IV syringe  2,000 mg IntraVENous On Call to April Walker MD           Allergies:     Allergies   Allergen Reactions    Erythromycin Shortness Of Breath     Pt states all \"mycins\" cause Shortness of Breath    Jantoven [Warfarin] Nausea And Vomiting     Can take Warfarin, Jantoven upsets her stomach       Problem List:    Patient Active Problem List   Diagnosis Code    [facility] I26.99    Tobacco abuse Z72.0    Back pain F72.3    Umbilical hernia L45.7       Past Medical History:        Diagnosis Date    Asthma     as small child, no problems as adult  PE (pulmonary embolism)     bilateral       Past Surgical History:        Procedure Laterality Date    LEG SURGERY      Femur fracture     TONSILLECTOMY         Social History:    Social History     Tobacco Use    Smoking status: Every Day     Packs/day: 0.50     Years: 22.00     Pack years: 11.00     Types: Cigarettes     Last attempt to quit: 4/10/2013     Years since quittin.5    Smokeless tobacco: Never   Substance Use Topics    Alcohol use: No                                Ready to quit: Not Answered  Counseling given: Not Answered      Vital Signs (Current):   Vitals:    10/31/22 1017   BP: 116/64   Pulse: 70   Resp: 18   Temp: 97 °F (36.1 °C)   TempSrc: Infrared   SpO2: 98%   Weight: 244 lb (110.7 kg)   Height: 5' 5\" (1.651 m)                                              BP Readings from Last 3 Encounters:   10/31/22 116/64   10/24/22 (!) 143/86   10/18/22 133/68       NPO Status: Time of last liquid consumption:                         Time of last solid consumption:                         Date of last liquid consumption: 10/30/22                        Date of last solid food consumption: 10/30/22    BMI:   Wt Readings from Last 3 Encounters:   10/31/22 244 lb (110.7 kg)   10/26/22 245 lb (111.1 kg)   10/24/22 245 lb (111.1 kg)     Body mass index is 40.6 kg/m².     CBC:   Lab Results   Component Value Date/Time    WBC 8.1 2013 05:35 AM    RBC 3.75 2013 05:35 AM    HGB 11.8 2013 05:35 AM    HCT 34.7 2013 05:35 AM    MCV 92.3 2013 05:35 AM    RDW 13.3 2013 05:35 AM     2013 06:05 AM       CMP:   Lab Results   Component Value Date/Time     2013 05:35 AM    K 4.0 2013 05:35 AM     2013 05:35 AM    CO2 28 2013 05:35 AM    BUN 8 2013 05:35 AM    CREATININE 0.6 2013 05:35 AM    LABGLOM >60 2013 07:16 AM    GLUCOSE 89 2013 05:35 AM    GLUCOSE 107 2011 05:22 AM    PROT 7.5 04/10/2013 02:55 AM    CALCIUM 8.9 04/13/2013 05:35 AM    BILITOT 0.4 04/10/2013 02:55 AM    ALKPHOS 88 04/10/2013 02:55 AM    AST 19 04/10/2013 02:55 AM    ALT 21 04/10/2013 02:55 AM       POC Tests: No results for input(s): POCGLU, POCNA, POCK, POCCL, POCBUN, POCHEMO, POCHCT in the last 72 hours. Coags:   Lab Results   Component Value Date/Time    PROTIME 15.8 07/31/2013 05:45 PM    INR 1.8 07/31/2013 05:45 PM    APTT 29.0 07/31/2013 05:45 PM       HCG (If Applicable):   Lab Results   Component Value Date    PREGSERUM NEGATIVE 01/06/2011        ABGs: No results found for: PHART, PO2ART, RPN4MWF, FDJ5ZTK, BEART, Q5PCDLQZ     Type & Screen (If Applicable):  No results found for: LABABO, LABRH    Drug/Infectious Status (If Applicable):  No results found for: HIV, HEPCAB    COVID-19 Screening (If Applicable): No results found for: COVID19        Anesthesia Evaluation  Patient summary reviewed no history of anesthetic complications:   Airway: Mallampati: II  TM distance: >3 FB   Neck ROM: full  Mouth opening: > = 3 FB   Dental:    (+) edentulous      Pulmonary: breath sounds clear to auscultation  (+) asthma (childhood-none as an adult):                            Cardiovascular:Negative CV ROS            Rhythm: regular  Rate: normal                    Neuro/Psych:               GI/Hepatic/Renal:   (+) morbid obesity          Endo/Other: Negative Endo/Other ROS                    Abdominal:             Vascular:   + PE. Other Findings:           Anesthesia Plan      general     ASA 3           MIPS: Postoperative opioids intended and Prophylactic antiemetics administered. Anesthetic plan and risks discussed with patient. Plan discussed with CRNA. DOS STAFF ADDENDUM:    Pt seen and examined, chart reviewed (including anesthesia, drug and allergy history). Anesthetic plan, risks, benefits, alternatives, and personnel involved discussed with patient.   Patient verbalized an understanding and agrees to proceed. Plan discussed with care team members and agreed upon.     Maty Tadeo MD  Staff Anesthesiologist  11:15 AM      Maty Tadeo MD   10/31/2022

## 2022-10-31 NOTE — ANESTHESIA POSTPROCEDURE EVALUATION
Department of Anesthesiology  Postprocedure Note    Patient: Yousuf Saldana  MRN: [id number]  YOB: 1970  Date of evaluation: 10/31/2022      Procedure Summary     Date: 10/31/22 Room / Location: [address] [place] [address] 06 / [facility]    Anesthesia Start: 1143 Anesthesia Stop: 6007    Procedure: [facility] (Abdomen) Diagnosis:       Umbilical hernia      (Umbilical hernia [A56.0])    Surgeons: Marcie Bell MD Responsible Provider: Lavern Barrett MD    Anesthesia Type: General ASA Status: 3          Anesthesia Type: General    Elier Phase I: Elier Score: 10    Elier Phase II: Elier Score: 10      Anesthesia Post Evaluation    Patient location during evaluation: PACU  Patient participation: complete - patient participated  Level of consciousness: awake  Airway patency: patent  Nausea & Vomiting: no nausea and no vomiting  Complications: no  Cardiovascular status: hemodynamically stable  Respiratory status: acceptable  Hydration status: euvolemic

## 2022-10-31 NOTE — H&P
Interval H&P: No new complaints since last being seen in the office by Dr. Ruben Nguyen. States she can periodically manually reduce her umbilical hernia. She denies any blood thinners. General Surgery History and Physical  [facility]     Patient's Name/Date of Birth: Delisa Leyva / 1970     Date: October 24, 2022      Surgeon: Blayne Benitez M.D.     PCP: Rula Razo DO     Chief Complaint: Abdominal hernia     HPI:   Delisa Leyva is a 50 y.o. female who presents for evaluation of ventral hernia. Timing is constant, radiation to midline, alleviated by none and started weeks ago while coughing and severity is 7/10. she has history of multiple abdominal surgeries. she has no history of previous repairs of the hernia. Denies nausea, vomiting, fever, chills, SOB, chest pain, diarrheal constipation. No history of abnormal colonoscopy. They are a smoker. Patient Active Problem List   Diagnosis    Pulmonary embolism and infarction ([facility]    Tobacco abuse    Back pain         Past Medical History        Past Medical History:   Diagnosis Date    Asthma       as small child, no problems as adult    PE (pulmonary embolism)       bilateral            Past Surgical History         Past Surgical History:   Procedure Laterality Date    LEG SURGERY         Femur fracture     TONSILLECTOMY                      Allergies   Allergen Reactions    Erythromycin Shortness Of Breath       Pt states all \"mycins\" cause Shortness of Breath    Jantoven [Warfarin] Nausea And Vomiting       Can take Warfarin, Jantoven upsets her stomach                Current Outpatient Medications on File Prior to Visit   Medication Sig Dispense Refill    naproxen (NAPROSYN) 500 MG tablet Take 1 tablet by mouth 2 times daily as needed for Pain (take with food and full glass of water.) 14 tablet 0      No current facility-administered medications on file prior to visit.             The patient has a family history that is negative for severe cardiovascular or respiratory issues, negative for reaction to anesthesia. No history of gastrointestinal cancer was reported in their mother or father. Time spent reviewing past medical, surgical, social and family history, vitals, nursing assessment and images. No changes from above documented history. Social History               Socioeconomic History    Marital status:        Spouse name: Not on file    Number of children: Not on file    Years of education: Not on file    Highest education level: Not on file   Occupational History    Not on file   Tobacco Use    Smoking status: Every Day       Packs/day: 0.50       Years: 22.00       Pack years: 11.00       Types: Cigarettes       Last attempt to quit: 4/10/2013       Years since quittin.5    Smokeless tobacco: Never   Substance and Sexual Activity    Alcohol use: No    Drug use: No    Sexual activity: Yes       Partners: Male   Other Topics Concern    Not on file   Social History Narrative    Not on file      Social Determinants of Health      Financial Resource Strain: Not on file   Food Insecurity: Not on file   Transportation Needs: Not on file   Physical Activity: Not on file   Stress: Not on file   Social Connections: Not on file   Intimate Partner Violence: Not on file   Housing Stability: Not on file            A complete 10 system review was performed and are otherwise negative unless mentioned in the above HPI. Specific negatives are listed below but may not include all those reviewed.      General ROS: negative obtundation, AMS  ENT ROS: negative rhinorrhea, epistaxis  Allergy and Immunology ROS: negative itchy/watery eyes or nasal congestion  Hematological and Lymphatic ROS: negative spontaneous bleeding or bruising  Endocrine ROS: negative  lethargy, mood swings, palpitations or polydipsia/polyuria  Respiratory ROS: negative sputum changes, stridor, tachypnea or wheezing  Cardiovascular ROS: negative for - loss of consciousness, murmur or orthopnea  Gastrointestinal ROS: negative for - hematochezia or hematemesis  Genito-Urinary ROS: negative for -  genital discharge or hematuria  Musculoskeletal ROS: negative for - focal weakness, gangrene  Psych/Neuro ROS: negative for - visual or auditory hallucinations, suicidal ideation     Physical exam:   BP (!) 143/86   Pulse 72   Temp 97 °F (36.1 °C) (Temporal)   Resp 18   Ht 5' 5.5\" (1.664 m)   Wt 245 lb (111.1 kg)   LMP 05/18/2015   BMI 40.15 kg/m²   General appearance:  NAD, appears stated age  Head: NCAT, PERRLA, EOMI, red conjunctiva  Neck: supple, no masses, trachea midline  Lungs: Equal chest rise bilateral, no retractions, no wheezing  Heart: Reg rate  Abdomen: soft, obese, partially reducibel umbilical hernia, no right groin hernia palpated  Skin; warm and dry, no cyanosis  Gu: no cva tenderness  Extremities: atraumatic, no focal motor deficits, no open wounds  Psych: No tremor, visual hallucinations           Radiology: I reviewed relevant abdominal imaging from this admission and that available in the EMR      Assessment:  Giulia Ralph is a 50 y.o. female with Incarcerated ventral hernia, right groin pain      Patient Active Problem List   Diagnosis    Pulmonary embolism and infarction (HCC)    Tobacco abuse    Back pain            Plan:  There is no contributing family history to this above complaint in the patient's mother or father  We discussed at length the patient's previous abdominal imaging, reviewed the anatomical findings of the physical exam and imaging  I discussed with him what the diagnosis of the hernia entails. I explained abdominal wall anatomy. We discussed mechanism of action of pain medication including Tylenol and ibuprofen for pain control  We discussed the operative and nonoperative management of hernias. We discussed the incarceration rate strangulation rate and risks of emergent surgery.   I reviewed at length emergent care and medical professional notes enclosed in the electronic medical record, independently reviewed their assessment and uses to formulate the opinion to recommend surgical intervention. I recommend OR for Laparoscopic robot assisted ventral hernia repair with mesh poss right inguinal hernia repair poss mesh     Discussed the risk, benefits and alternatives of surgery including wound infections, bleeding, scar, seroma, mesh infection, mesh removal and migration and recurrent hernia formation and the risks of general anesthetic including MI, CVA, sudden death or reactions to anesthetic medications. The patient understands the risks and alternatives and the possibility of converting to an open procedure. All questions were answered to the patient's satisfaction and they freely signed the consent.             Pato Gilman MD  8:28 AM  10/24/2022    Will discuss with Dr. Amalia Montemayor

## 2022-10-31 NOTE — OP NOTE
Operative Note  [facility]  Forrest Celis MD, Jah Cox 9038  MRN: [id number]  DATE OF PROCEDURE: 10/31/2022    PREOPERATIVE DIAGNOSIS:  Incarcerated ventral hernia. POSTOPERATIVE DIAGNOSIS:  Incarcerated ventral hernia. PROCEDURE PERFORMED:  Laparoscopic robotic-assisted JOHN repair of incarcerated ventral hernia with mesh. ANESTHESIA: General endotracheal.     SURGEON: Pato Gilman MD     ASSISTANT: None. COMPLICATIONS: None. ESTIMATED BLOOD LOSS: Minimal.     FLUIDS: Crystalloid. SPECIMENS: none. Implant Name Type Inv. Item Serial No.  Lot No. LRB No. Used Action   MESH VAL [id number] INGUINAL POLYPR SYN FLAT L PORE MFIL - [id number]  MESH VAL [id number] INGUINAL POLYPR SYN FLAT L PORE MFIL  BARD DAVOL-WD [id number] N/A 1 Implanted          INDICATIONS: Jesus Benson is a 50 y.o. female with symptomatic ventral hernia. On physical exam, they had a palpable hernia. After being explained the risks, benefits, and alternatives of procedure, including but not limited to bleeding, scar, infection, recurrence they agreed to proceed. DESCRIPTION OF PROCEDURE:  This is a 50 y.o. female with a history of symptomatic incarcerated ventral hernia. After being explained the risks, benefits and alternatives of the procedure, they agreed to proceed. They were taken to the operating room, placed supine, administered general anesthesia and intubated. Once the airway was secured and he was adequately sedated, he was prepped and draped in normal sterile fashion. Time-out was performed to confirm surgical site and the patient's name. They received preoperative antibiotics IV within 30 minutes of incision. They had bilateral PCDs placed for DVT prophylaxis. I initially made an 8-mm incision in the left upper quadrant, inserted a Veress needle, confirmed needle placement and insufflated to 15 mmHg.   We removed the Veress needle and inserted an 8-mm robotic trocar, inserted the camera to follow and inspected the abdomen. I immediately could appreciate omentum incarcerated in the ventral hernia. We inserted two more 8-mm trocars, one in the left lower quadrant and one in the left lateral abdomen under direct visualization. The right groin was inspected and no hernia was appreciated. The sigmoid colon was remarkable for extensive diverticulosis. There was no purulence or infection appreciated. No source of the patients right groin pain was appreciated intraabdominally. The bed was fractured to separate the costal margin and ASIS. We then docked the robot and performed a medial to lateral dissection, started at the left side of the peritoneum. I was able to reduce the omentum prior to starting my dissection, dissecting the preperitoneal plane, dissecting across the midline and identified the hernia sac, completely reducing it as well as some preperitoneal fat that was incarcerated within the hernia itself. They were completely reduced. The hernia defect size was about 2 cm in diameter. I cleared off an area of about 3.5 x 6.5 inches in the preperitoneal space. I then used a  nonabsorbable permanent 6-inch barbed suture in order to reapproximate the fascial edges with the hernia defect. I completely closed the defect. I then used a piece of 3 x 6 inch Bard soft mesh, cut it to size to fit the space and inserted it into the preperitoneal space and overlap of at least 4 cm on either side as well as about 6 cm cephalad and caudad overlapping on the hernia repair, secured in place with 3-0 Vicryl interrupted stitches. It was secured into the posterior fascial sheath. I then reapproximated the peritoneum and this completely  obliterated the mesh into the preperitoneal space and there was no mesh exposed at the intra-abdominal or intraperitoneal contents.   I then closed the peritoneum using a running absorbable 6-inch barbed suture until I completely closed the peritoneal defect. I then removed each one of the needles, and removed each one of the trocars, decompressed the abdomen, and reapproximated the skin incisions with 4-0 Monocryl suture and surgical glue. The patient was then awoken, extubated, and transferred to postoperative care unit in stable condition. All instrument counts, lap counts and needle counts were correct at completion of the procedure.     Josué Manzano MD, MS  Minimally Invasive and Bariatric Surgery  [phone] (p)  10/31/2022  12:28 PM

## 2022-11-14 ENCOUNTER — OFFICE VISIT (OUTPATIENT)
Dept: SURGERY | Age: 52
End: 2022-11-14

## 2022-11-14 VITALS
HEIGHT: 65 IN | SYSTOLIC BLOOD PRESSURE: 132 MMHG | TEMPERATURE: 97.7 F | WEIGHT: 244 LBS | HEART RATE: 85 BPM | DIASTOLIC BLOOD PRESSURE: 89 MMHG | BODY MASS INDEX: 40.65 KG/M2

## 2022-11-14 DIAGNOSIS — K42.9 UMBILICAL HERNIA WITHOUT OBSTRUCTION AND WITHOUT GANGRENE: Primary | ICD-10-CM

## 2022-11-14 PROCEDURE — 99024 POSTOP FOLLOW-UP VISIT: CPT | Performed by: SURGERY

## 2022-11-14 RX ORDER — METHOCARBAMOL 500 MG/1
500 TABLET, FILM COATED ORAL 4 TIMES DAILY PRN
Qty: 20 TABLET | Refills: 0 | Status: SHIPPED | OUTPATIENT
Start: 2022-11-14

## 2022-11-14 RX ORDER — IBUPROFEN 800 MG/1
800 TABLET ORAL EVERY 8 HOURS PRN
Qty: 30 TABLET | Refills: 0 | Status: SHIPPED | OUTPATIENT
Start: 2022-11-14 | End: 2022-11-24

## 2022-11-14 NOTE — PROGRESS NOTES
General Surgery Office Note  Avelina Franco MD, MS    Patient's Name/Date of Birth: Cherelle Lights / 1970    Date: November 14, 2022     Chief compaint: Postop visit from laparoscopic hernia repair with mesh    Surgeon: Bethanne Siemens, MD    Patient Active Problem List   Diagnosis    Pulmonary embolism and infarction [facility])    Tobacco abuse    Back pain    Umbilical hernia       Subjective: Doing well, no new complaints, pain prior to surgery has resolved, tolerating diet, bowel function normal, anxious to return to normal physical activity    Objective:  /89   Pulse 85   Temp 97.7 °F (36.5 °C)   Ht 5' 5\" (1.651 m)   Wt 244 lb (110.7 kg)   LMP 05/18/2015   BMI 40.60 kg/m²   Labs:  No results for input(s): WBC, HGB, HCT in the last 72 hours. Invalid input(s): PLR  Lab Results   Component Value Date    CREATININE 0.6 04/13/2013    BUN 8 04/13/2013     04/13/2013    K 4.0 04/13/2013     04/13/2013    CO2 28 04/13/2013     No results for input(s): LIPASE, AMYLASE in the last 72 hours.       Review of Systems -  General ROS: negative for - chills, fatigue or malaise  ENT ROS: negative for - hearing change, nasal congestion or nasal discharge  Allergy and Immunology ROS: negative for - hives, itchy/watery eyes or nasal congestion  Hematological and Lymphatic ROS: negative for - blood clots, blood transfusions, bruising or fatigue  Endocrine ROS: negative for - malaise/lethargy, mood swings, palpitations or polydipsia/polyuria  Respiratory ROS: negative for - sputum changes, stridor, tachypnea or wheezing  Cardiovascular ROS: negative for - irregular heartbeat, loss of consciousness, murmur or orthopnea  Gastrointestinal ROS: negative for - diarrhea, or hematemesis  Genito-Urinary ROS: negative for -  genital discharge, genital ulcers or hematuria  Musculoskeletal ROS: negative for - gait disturbance, muscle pain or muscular weakness  Psych/Neuro ROS: negative for - visual or auditory hallucinations, suicidal ideation    General appearance: AA, NAD  HEENT: NCAT, PERRLA, EOMI  Lungs: Clear, equal rise bilateral  Heart: Reg  Abdomen: soft, nondistended, nontender, incisions well healed, no signs of infection, no cellulitis, no hematoma  Ext: Atraumatic, no swelling  : No hernia         Assessment/Plan:  Sakina Fernández is a 50 y.o. female 2 weeks s/p laparoscopic robot assisted hernia repair with mesh. Doing well.     Resume activity gradually   No heavy lifting more than 20lbs for 4 weeks total  Pathology reviewed and copy provided  Follow up as needed    Physician Signature: Electronically signed by Dr. Willie Ocampo  [phone] (p)  11/14/2022  9:26 AM

## 2023-09-28 ENCOUNTER — APPOINTMENT (OUTPATIENT)
Dept: CT IMAGING | Age: 53
End: 2023-09-28
Payer: COMMERCIAL

## 2023-09-28 ENCOUNTER — HOSPITAL ENCOUNTER (EMERGENCY)
Age: 53
Discharge: HOME | End: 2023-09-28
Attending: STUDENT IN AN ORGANIZED HEALTH CARE EDUCATION/TRAINING PROGRAM
Payer: COMMERCIAL

## 2023-09-28 VITALS
OXYGEN SATURATION: 99 % | SYSTOLIC BLOOD PRESSURE: 185 MMHG | HEART RATE: 108 BPM | BODY MASS INDEX: 47.48 KG/M2 | DIASTOLIC BLOOD PRESSURE: 108 MMHG | RESPIRATION RATE: 14 BRPM | WEIGHT: 285 LBS | HEIGHT: 65 IN | TEMPERATURE: 98.3 F

## 2023-09-28 DIAGNOSIS — R11.2 NAUSEA AND VOMITING, UNSPECIFIED VOMITING TYPE: ICD-10-CM

## 2023-09-28 DIAGNOSIS — K80.20 CALCULUS OF GALLBLADDER WITHOUT CHOLECYSTITIS WITHOUT OBSTRUCTION: Primary | ICD-10-CM

## 2023-09-28 LAB
ALBUMIN SERPL-MCNC: 4.9 G/DL (ref 3.5–5.2)
ALP SERPL-CCNC: 113 U/L (ref 35–104)
ALT SERPL-CCNC: 65 U/L (ref 0–32)
ANION GAP SERPL CALCULATED.3IONS-SCNC: 10 MMOL/L (ref 7–16)
AST SERPL-CCNC: 38 U/L (ref 0–31)
BASOPHILS # BLD: 0.03 K/UL (ref 0–0.2)
BASOPHILS NFR BLD: 0 % (ref 0–2)
BILIRUB SERPL-MCNC: 0.4 MG/DL (ref 0–1.2)
BUN SERPL-MCNC: 15 MG/DL (ref 6–20)
CALCIUM SERPL-MCNC: 9.6 MG/DL (ref 8.6–10.2)
CHLORIDE SERPL-SCNC: 103 MMOL/L (ref 98–107)
CO2 SERPL-SCNC: 29 MMOL/L (ref 22–29)
CREAT SERPL-MCNC: 0.7 MG/DL (ref 0.5–1)
EOSINOPHIL # BLD: 0.04 K/UL (ref 0.05–0.5)
EOSINOPHILS RELATIVE PERCENT: 0 % (ref 0–6)
ERYTHROCYTE [DISTWIDTH] IN BLOOD BY AUTOMATED COUNT: 12.5 % (ref 11.5–15)
GFR SERPL CREATININE-BSD FRML MDRD: >60 ML/MIN/1.73M2
GLUCOSE SERPL-MCNC: 123 MG/DL (ref 74–99)
HCT VFR BLD AUTO: 47 % (ref 34–48)
HGB BLD-MCNC: 15.6 G/DL (ref 11.5–15.5)
IMM GRANULOCYTES # BLD AUTO: 0.04 K/UL (ref 0–0.58)
IMM GRANULOCYTES NFR BLD: 0 % (ref 0–5)
LACTATE BLDV-SCNC: 1.4 MMOL/L (ref 0.5–2.2)
LIPASE SERPL-CCNC: 52 U/L (ref 13–60)
LYMPHOCYTES NFR BLD: 1.61 K/UL (ref 1.5–4)
LYMPHOCYTES RELATIVE PERCENT: 16 % (ref 20–42)
MCH RBC QN AUTO: 30.6 PG (ref 26–35)
MCHC RBC AUTO-ENTMCNC: 33.2 G/DL (ref 32–34.5)
MCV RBC AUTO: 92.3 FL (ref 80–99.9)
MONOCYTES NFR BLD: 0.38 K/UL (ref 0.1–0.95)
MONOCYTES NFR BLD: 4 % (ref 2–12)
NEUTROPHILS NFR BLD: 79 % (ref 43–80)
NEUTS SEG NFR BLD: 8.1 K/UL (ref 1.8–7.3)
PLATELET # BLD AUTO: 309 K/UL (ref 130–450)
PMV BLD AUTO: 10 FL (ref 7–12)
POTASSIUM SERPL-SCNC: 4.2 MMOL/L (ref 3.5–5)
PROT SERPL-MCNC: 8.2 G/DL (ref 6.4–8.3)
RBC # BLD AUTO: 5.09 M/UL (ref 3.5–5.5)
SODIUM SERPL-SCNC: 142 MMOL/L (ref 132–146)
WBC OTHER # BLD: 10.2 K/UL (ref 4.5–11.5)

## 2023-09-28 PROCEDURE — 6360000002 HC RX W HCPCS

## 2023-09-28 PROCEDURE — 96375 TX/PRO/DX INJ NEW DRUG ADDON: CPT

## 2023-09-28 PROCEDURE — 85025 COMPLETE CBC W/AUTO DIFF WBC: CPT

## 2023-09-28 PROCEDURE — 6360000004 HC RX CONTRAST MEDICATION: Performed by: RADIOLOGY

## 2023-09-28 PROCEDURE — 2580000003 HC RX 258

## 2023-09-28 PROCEDURE — 83605 ASSAY OF LACTIC ACID: CPT

## 2023-09-28 PROCEDURE — 74177 CT ABD & PELVIS W/CONTRAST: CPT

## 2023-09-28 PROCEDURE — 96374 THER/PROPH/DIAG INJ IV PUSH: CPT

## 2023-09-28 PROCEDURE — 80053 COMPREHEN METABOLIC PANEL: CPT

## 2023-09-28 PROCEDURE — 83690 ASSAY OF LIPASE: CPT

## 2023-09-28 PROCEDURE — 99285 EMERGENCY DEPT VISIT HI MDM: CPT

## 2023-09-28 RX ORDER — ONDANSETRON 2 MG/ML
4 INJECTION INTRAMUSCULAR; INTRAVENOUS ONCE
Status: COMPLETED | OUTPATIENT
Start: 2023-09-28 | End: 2023-09-28

## 2023-09-28 RX ORDER — PROCHLORPERAZINE EDISYLATE 5 MG/ML
10 INJECTION INTRAMUSCULAR; INTRAVENOUS ONCE
Status: COMPLETED | OUTPATIENT
Start: 2023-09-28 | End: 2023-09-28

## 2023-09-28 RX ORDER — 0.9 % SODIUM CHLORIDE 0.9 %
1000 INTRAVENOUS SOLUTION INTRAVENOUS ONCE
Status: COMPLETED | OUTPATIENT
Start: 2023-09-28 | End: 2023-09-28

## 2023-09-28 RX ORDER — KETOROLAC TROMETHAMINE 30 MG/ML
15 INJECTION, SOLUTION INTRAMUSCULAR; INTRAVENOUS ONCE
Status: COMPLETED | OUTPATIENT
Start: 2023-09-28 | End: 2023-09-28

## 2023-09-28 RX ORDER — ONDANSETRON 4 MG/1
4 TABLET, FILM COATED ORAL EVERY 8 HOURS PRN
Qty: 21 TABLET | Refills: 0 | Status: SHIPPED | OUTPATIENT
Start: 2023-09-28 | End: 2023-10-05

## 2023-09-28 RX ORDER — FAMOTIDINE 20 MG/1
20 TABLET, FILM COATED ORAL 2 TIMES DAILY
Qty: 60 TABLET | Refills: 0 | Status: SHIPPED | OUTPATIENT
Start: 2023-09-28

## 2023-09-28 RX ADMIN — KETOROLAC TROMETHAMINE 15 MG: 30 INJECTION, SOLUTION INTRAMUSCULAR; INTRAVENOUS at 14:48

## 2023-09-28 RX ADMIN — SODIUM CHLORIDE 1000 ML: 9 INJECTION, SOLUTION INTRAVENOUS at 14:46

## 2023-09-28 RX ADMIN — ONDANSETRON 4 MG: 2 INJECTION INTRAMUSCULAR; INTRAVENOUS at 14:47

## 2023-09-28 RX ADMIN — IOPAMIDOL 75 ML: 755 INJECTION, SOLUTION INTRAVENOUS at 15:35

## 2023-09-28 RX ADMIN — PROCHLORPERAZINE EDISYLATE 10 MG: 5 INJECTION INTRAMUSCULAR; INTRAVENOUS at 16:50

## 2023-09-28 ASSESSMENT — PAIN DESCRIPTION - DESCRIPTORS
DESCRIPTORS: ACHING;TENDER;DISCOMFORT
DESCRIPTORS: DISCOMFORT

## 2023-09-28 ASSESSMENT — PAIN DESCRIPTION - ONSET: ONSET: SUDDEN

## 2023-09-28 ASSESSMENT — PAIN DESCRIPTION - ORIENTATION: ORIENTATION: MID;UPPER

## 2023-09-28 ASSESSMENT — PAIN DESCRIPTION - LOCATION
LOCATION: ABDOMEN
LOCATION: ABDOMEN

## 2023-09-28 ASSESSMENT — PAIN - FUNCTIONAL ASSESSMENT: PAIN_FUNCTIONAL_ASSESSMENT: 0-10

## 2023-09-28 ASSESSMENT — PAIN SCALES - GENERAL: PAINLEVEL_OUTOF10: 6

## 2023-09-28 ASSESSMENT — PAIN DESCRIPTION - PAIN TYPE: TYPE: ACUTE PAIN

## 2023-09-28 ASSESSMENT — PAIN DESCRIPTION - FREQUENCY: FREQUENCY: CONTINUOUS

## 2023-09-28 NOTE — ED PROVIDER NOTES
[address] ENCOUNTER        Pt Name: Cortez Souza  MRN: [id number]  [facility] 1970  Date of evaluation: 9/28/2023  Provider: Tate Bojorquez DO  PCP: No primary care provider on file. Note Started: 2:13 PM EDT 9/28/23    CHIEF COMPLAINT       Chief Complaint   Patient presents with    Abdominal Pain     N/V/D. Started this am.        HISTORY OF PRESENT ILLNESS: 1 or more Elements   History From: Patient      Cortez Souza is a 48 y.o. female who presents for concerns of abdominal pain. Patient states this morning she woke up and had episodes of nausea, vomiting, and diarrhea. Patient denies any contacts with similar symptoms. Patient states she does work these episodes continued. Patient denies any blood in stool. Patient states he has not had any fevers or chills. Patient has midline abdominal pain. Patient hernia surgery approximately a year ago. Patient denies any fever, chills, chest pain, shortness of breath, headache  Patient confirms abdominal pain, nausea, vomiting, diarrhea      Nursing Notes were all reviewed and agreed with or any disagreements were addressed in the HPI. REVIEW OF SYSTEMS :      Positives and Pertinent negatives as per HPI.      SURGICAL HISTORY     Past Surgical History:   Procedure Laterality Date    HERNIA REPAIR N/A 10/31/2022    LAPAROSCOPIC ROBOTIC XI HERNIA VENTRAL REPAIR WITH MESH performed by Iris Liu MD at [address] [place] [address]      Femur fracture     [address]       Discharge Medication List as of 9/28/2023  5:29 PM          ALLERGIES     Erythromycin and Jayden Holiday [warfarin]    FAMILYHISTORY       Family History   Problem Relation Age of Onset    Heart Disease Mother     Cancer Mother         colorectal    Diabetes Mother     Diabetes Paternal Uncle     Cancer Maternal Grandmother     Heart Disease Maternal Grandmother     Cancer Paternal normal lipase making pancreatitis unlikely. Patient had normal lactate. Patient had no leukocytosis. Patient was able to tolerate p.o. intake. Patient was discharged with a prescription for Zofran and given return precautions. Patient was agreeable. ED Course as of 09/30/23 1423   Thu Sep 28, 2023   1658 Upon reevaluation patient states the medication is helping. [JH]      ED Course User Index  [JH] Ese Ramírez DO            Chronic Conditions:   Past Medical History:   Diagnosis Date    Asthma     as small child, no problems as adult    PE (pulmonary embolism)     bilateral         Records Reviewed: Note from 11/14/2022 for patient's surgical history with her umbilical hernia. CONSULTS: (Who and What was discussed)  None      FINAL IMPRESSION      1. Calculus of gallbladder without cholecystitis without obstruction    2.  Nausea and vomiting, unspecified vomiting type          DISPOSITION/PLAN     DISPOSITION Decision To Discharge 09/28/2023 05:28:55 PM      PATIENT REFERRED TO:  Darline Diehl, [address] 61 [phone]    Call in 1 day        DISCHARGE MEDICATIONS:  Discharge Medication List as of 9/28/2023  5:29 PM        START taking these medications    Details   ondansetron (ZOFRAN) 4 MG tablet Take 1 tablet by mouth every 8 hours as needed for Nausea or Vomiting, Disp-21 tablet, R-0Normal      famotidine (PEPCID) 20 MG tablet Take 1 tablet by mouth 2 times daily, Disp-60 tablet, R-0Normal                  (Please note that portions of this note were completed with a voice recognition program.  Efforts were made to edit the dictations but occasionally words are mis-transcribed.)    Ese Ramírez DO (electronically signed)           Ese Ramírez DO  Resident  09/30/23 2309

## 2025-03-22 ENCOUNTER — HOSPITAL ENCOUNTER (EMERGENCY)
Age: 55
Discharge: HOME | End: 2025-03-22
Payer: COMMERCIAL

## 2025-03-22 VITALS
TEMPERATURE: 98.2 F | HEART RATE: 70 BPM | OXYGEN SATURATION: 100 % | DIASTOLIC BLOOD PRESSURE: 88 MMHG | BODY MASS INDEX: 44.93 KG/M2 | RESPIRATION RATE: 18 BRPM | WEIGHT: 270 LBS | SYSTOLIC BLOOD PRESSURE: 152 MMHG

## 2025-03-22 DIAGNOSIS — L02.212 CUTANEOUS ABSCESS OF BACK EXCLUDING BUTTOCKS: Primary | ICD-10-CM

## 2025-03-22 PROCEDURE — 87070 CULTURE OTHR SPECIMN AEROBIC: CPT

## 2025-03-22 PROCEDURE — 99211 OFF/OP EST MAY X REQ PHY/QHP: CPT

## 2025-03-22 PROCEDURE — 87205 SMEAR GRAM STAIN: CPT

## 2025-03-22 RX ORDER — SULFAMETHOXAZOLE AND TRIMETHOPRIM 800; 160 MG/1; MG/1
1 TABLET ORAL 2 TIMES DAILY
Qty: 14 TABLET | Refills: 0 | Status: SHIPPED | OUTPATIENT
Start: 2025-03-22 | End: 2025-03-29

## 2025-03-22 ASSESSMENT — PAIN SCALES - GENERAL: PAINLEVEL_OUTOF10: 7

## 2025-03-22 ASSESSMENT — PAIN DESCRIPTION - FREQUENCY: FREQUENCY: CONTINUOUS

## 2025-03-22 ASSESSMENT — PAIN DESCRIPTION - DESCRIPTORS: DESCRIPTORS: PRESSURE;BURNING;DISCOMFORT

## 2025-03-22 ASSESSMENT — PAIN - FUNCTIONAL ASSESSMENT: PAIN_FUNCTIONAL_ASSESSMENT: 0-10

## 2025-03-22 ASSESSMENT — PAIN DESCRIPTION - ORIENTATION: ORIENTATION: UPPER

## 2025-03-22 ASSESSMENT — PAIN DESCRIPTION - LOCATION: LOCATION: BACK

## 2025-03-22 NOTE — ED PROVIDER NOTES
Percentile Temp Temp Source Pulse Respirations SpO2   (!) 152/88 -- -- 98.2 °F (36.8 °C) Temporal 70 18 100 %      Height Weight - Scale         -- 122.5 kg (270 lb)             Constitutional/General: Alert and oriented x3, well appearing, non toxic.  HEENT:  Airway patent.  Neck: Supple, full ROM, no meningeal signs.  Respiratory: Not in respiratory distress.  CV:  Regular rate. Regular rhythm.   Chest: No chest wall tenderness.  GI:  Abdomen Soft, nontender, Non distended.  No rebound, guarding, or rigidity.  Musculoskeletal: Moves all extremities x 4. Warm and well perfused, no clubbing, cyanosis, or edema.   Integument: Skin warm and dry. No rashes.  Moderate size abscess middle of the upper back.  Lymphatic: No lymphadenopathy noted.  Neurologic: GCS 15, no focal deficits, symmetric strength 5/5 in the upper and lower extremities bilaterally.  Psychiatric: Normal Affect.    DIAGNOSTIC RESULTS   (All laboratory and radiology results have been personally reviewed by myself)  Labs:  No results found for this visit on 03/22/25.  Imaging:  All Radiology results interpreted by Radiologist unless otherwise noted.  No orders to display       ED COURSE   Vitals:    Vitals:    03/22/25 1652   BP: (!) 152/88   Pulse: 70   Resp: 18   Temp: 98.2 °F (36.8 °C)   TempSrc: Temporal   SpO2: 100%   Weight: 122.5 kg (270 lb)       Patient was given the following medications:  Medications - No data to display       PROCEDURES   During preparation for I&D with a Betadine swab, abscess started draining.  Abscess was then expressed to produce a large amount of purulent drainage.  Cultures were obtained.  Abscess was expressed to break up loculations and further drainage.  Patient tolerated procedure well.    REASSESSMENT   3/22/25       Time: 1710  Patient’s condition is improving after treatment and has improved and is mildly symptomatic .    CONSULTS:  None    DIFFERENTIAL DX_MDM   MDM:   Social Determinants: None    Records  by HIGINIO Jerome NP   DD: 3/22/25  **This report was transcribed using voice recognition software. Every effort was made to ensure accuracy; however, inadvertent computerized transcription errors may be present.  END OF ED PROVIDER NOTE      Juan Montalvo APRN - NP  03/22/25 [id number]

## 2025-03-22 NOTE — DISCHARGE INSTRUCTIONS
Warm compresses to the affected area until area has healed.  Motrin or Tylenol as needed for pain.

## 2025-03-23 LAB
MICROORGANISM SPEC CULT: NORMAL
MICROORGANISM/AGENT SPEC: NORMAL
SPECIMEN DESCRIPTION: NORMAL

## 2025-03-25 LAB
MICROORGANISM SPEC CULT: NORMAL
MICROORGANISM/AGENT SPEC: NORMAL
SPECIMEN DESCRIPTION: NORMAL

## (undated) DEVICE — ARM DRAPE

## (undated) DEVICE — SYRINGE MED 10ML TRNSLUC BRL PLUNG BLK MRK POLYPR CTRL

## (undated) DEVICE — PACK SURG LAP CHOLE CUSTOM

## (undated) DEVICE — TIP-UP FENESTRATED GRASPER: Brand: ENDOWRIST

## (undated) DEVICE — ELECTRODE PT RET AD L9FT HI MOIST COND ADH HYDRGEL CORDED

## (undated) DEVICE — CLEANER LENS C-CLEAR

## (undated) DEVICE — INSUFFLATION NEEDLE TO ESTABLISH PNEUMOPERITONEUM.: Brand: INSUFFLATION NEEDLE

## (undated) DEVICE — APPLICATOR MEDICATED 26 CC SOLUTION HI LT ORNG CHLORAPREP

## (undated) DEVICE — GARMENT,MEDLINE,DVT,INT,CALF,MED, GEN2: Brand: MEDLINE

## (undated) DEVICE — TIP COVER ACCESSORY

## (undated) DEVICE — DOUBLE BASIN SET: Brand: MEDLINE INDUSTRIES, INC.

## (undated) DEVICE — GOWN,SIRUS,NONRNF,SETINSLV,XL,20/CS: Brand: MEDLINE

## (undated) DEVICE — SCISSORS SURG DIA8MM MPLR CRV ENDOWRIST

## (undated) DEVICE — NEEDLE HYPO 25GA L1.5IN BLU POLYPR HUB S STL REG BVL STR

## (undated) DEVICE — COVER,LIGHT HANDLE,FLX,1/PK: Brand: MEDLINE INDUSTRIES, INC.

## (undated) DEVICE — CANNULA SEAL

## (undated) DEVICE — MEGA SUTURECUT ND: Brand: ENDOWRIST

## (undated) DEVICE — ELECTRO LUBE IS A SINGLE PATIENT USE DEVICE THAT IS INTENDED TO BE USED ON ELECTROSURGICAL ELECTRODES TO REDUCE STICKING.: Brand: KEY SURGICAL ELECTRO LUBE

## (undated) DEVICE — SHEET DRAPE FULL 70X100

## (undated) DEVICE — COLUMN DRAPE

## (undated) DEVICE — GLOVE ORANGE PI 7 1/2   MSG9075

## (undated) DEVICE — SUTURE V-LOC 180 SZ 0 L9IN ABSRB GRN GS-21 L37MM 1/2 CIR VLOCL0346

## (undated) DEVICE — MARKER,SKIN,WI/RULER AND LABELS: Brand: MEDLINE

## (undated) DEVICE — SUTURE DEV SZ 0 L6IN N ABSORBABLE

## (undated) DEVICE — MICRO TIP WIPE: Brand: DEVON